# Patient Record
Sex: FEMALE
[De-identification: names, ages, dates, MRNs, and addresses within clinical notes are randomized per-mention and may not be internally consistent; named-entity substitution may affect disease eponyms.]

---

## 2019-04-03 PROBLEM — Z00.00 ENCOUNTER FOR PREVENTIVE HEALTH EXAMINATION: Status: ACTIVE | Noted: 2019-04-03

## 2019-05-02 ENCOUNTER — APPOINTMENT (OUTPATIENT)
Dept: ENDOCRINOLOGY | Facility: CLINIC | Age: 59
End: 2019-05-02

## 2019-07-19 ENCOUNTER — APPOINTMENT (OUTPATIENT)
Dept: ENDOCRINOLOGY | Facility: CLINIC | Age: 59
End: 2019-07-19
Payer: COMMERCIAL

## 2019-07-19 VITALS
BODY MASS INDEX: 23.11 KG/M2 | SYSTOLIC BLOOD PRESSURE: 117 MMHG | HEART RATE: 68 BPM | WEIGHT: 156 LBS | HEIGHT: 69 IN | DIASTOLIC BLOOD PRESSURE: 81 MMHG

## 2019-07-19 DIAGNOSIS — Z83.49 FAMILY HISTORY OF OTHER ENDOCRINE, NUTRITIONAL AND METABOLIC DISEASES: ICD-10-CM

## 2019-07-19 DIAGNOSIS — E05.90 THYROTOXICOSIS, UNSPECIFIED W/OUT THYROTOXIC CRISIS OR STORM: ICD-10-CM

## 2019-07-19 DIAGNOSIS — E04.1 NONTOXIC SINGLE THYROID NODULE: ICD-10-CM

## 2019-07-19 DIAGNOSIS — Z87.891 PERSONAL HISTORY OF NICOTINE DEPENDENCE: ICD-10-CM

## 2019-07-19 DIAGNOSIS — Z82.62 FAMILY HISTORY OF OSTEOPOROSIS: ICD-10-CM

## 2019-07-19 PROCEDURE — 99204 OFFICE O/P NEW MOD 45 MIN: CPT

## 2019-07-19 RX ORDER — PSYLLIUM HUSK 0.4 G
CAPSULE ORAL
Refills: 0 | Status: ACTIVE | COMMUNITY

## 2019-07-19 RX ORDER — CHROMIUM 200 MCG
TABLET ORAL
Refills: 0 | Status: ACTIVE | COMMUNITY

## 2019-07-19 RX ORDER — BIOTIN 10 MG
TABLET ORAL
Refills: 0 | Status: ACTIVE | COMMUNITY

## 2019-07-19 RX ORDER — OMEGA-3/DHA/EPA/FISH OIL 300-1000MG
CAPSULE ORAL
Refills: 0 | Status: ACTIVE | COMMUNITY

## 2019-08-14 NOTE — PHYSICAL EXAM
[Alert] : alert [No Acute Distress] : no acute distress [Healthy Appearance] : healthy appearance [Normal Sclera/Conjunctiva] : normal sclera/conjunctiva [Normal Oropharynx] : the oropharynx was normal [No Neck Mass] : no neck mass was observed [Supple] : the neck was supple [No LAD] : no lymphadenopathy [Thyroid Not Enlarged] : the thyroid was not enlarged [No Thyroid Nodules] : there were no palpable thyroid nodules [Normal Rate and Effort] : normal respiratory rhythm and effort [Clear to Auscultation] : lungs were clear to auscultation bilaterally [Normal Rate] : heart rate was normal  [Normal S1, S2] : normal S1 and S2 [Regular Rhythm] : with a regular rhythm [No Stigmata of Cushings Syndrome] : no stigmata of cushings syndrome [Normal Gait] : normal gait [Normal Insight/Judgement] : insight and judgment were intact [No Proptosis] : no proptosis [No Lid Lag] : no lid lag [Kyphosis] : no kyphosis present [Acanthosis Nigricans] : no acanthosis nigricans [de-identified] : no moon facies, no supraclavicular fat pads

## 2019-08-14 NOTE — ADDENDUM
[FreeTextEntry1] : Recent thyroid ultrasound with a 0.4 cm spongiform nodule in the left lobe; no other nodules. We have not received any blood test results. I left a message for Ms. Fraser for call back. 8/14/19

## 2019-08-14 NOTE — HISTORY OF PRESENT ILLNESS
[FreeTextEntry1] : Ms. Fraser is a 59 year-old woman presenting for initial evaluation of subclinical hyperthyroidism.\par \par Subclinical hyperthyroidism. Possible thyroid nodules.\par She has a history of a thyroid condition during pregnancy and took medication until the postpartum period. She states she has had fluctuating thyroid stimulating hormone levels since that time. \par She was noted to have TSH 0.28 uIU/mL in February 2019, with normal free T4. Thyroid stimulating immunoglobin was within range, although upper limit of normal.\par Repeat testing in May 2019 demonstrated TSH 0.37 uIU/mL, with normal free T4/T3. Thyroid stimulating immunoglobin was again within range (although upper limit of normal) with negative TSH receptor antibody and negative thyroid peroxidase antibody.\par She is not taking any medications; supplements as per list. She was taking biotin but not in the past few weeks. No recent iodinated contrast.\par She was having regular thyroid ultrasounds with Dr. Addy Caicedo, but none in the past few years. No history of biopsy of any nodule.\par No history of radiation exposure.\par Her mother has a history of thyroid disease and osteoporosis. Her mother had a history of hyperthyroidism in the past now with postablation hypothyroidism.\par \par She has a history of constipation and some hair loss, overall improving. No dysphagia, fixed/hard neck mass, shortness of breath. No change in weight, palpitations, diarrhea/constipation, skin changes, depression/anxiety. She had recent gum surgery.

## 2019-08-14 NOTE — ASSESSMENT
[FreeTextEntry1] : Subclinical hyperthyroidism. Possible thyroid nodules. TSH may be low due to nonthyroidal illness, thyroiditis, Graves' disease, or toxic nodule. She has longstanding constipation and more recent hair loss, overall improving. She has no signs of thyroid disease on physical examination. Prior evaluation with thyroid stimulating immunoglobin at the upper limit of normal but within range. We will send a thyroid function panel, thyroid peroxidase, thyroglobulin, and TSH receptor antibodies now. We will request a thyroid ultrasound. We will consider a thyroid uptake and scan if TSH remains suppressed.\par

## 2019-10-25 ENCOUNTER — APPOINTMENT (OUTPATIENT)
Dept: ENDOCRINOLOGY | Facility: CLINIC | Age: 59
End: 2019-10-25

## 2021-12-27 ENCOUNTER — TRANSCRIPTION ENCOUNTER (OUTPATIENT)
Age: 61
End: 2021-12-27